# Patient Record
Sex: MALE | Race: WHITE | Employment: FULL TIME | URBAN - NONMETROPOLITAN AREA
[De-identification: names, ages, dates, MRNs, and addresses within clinical notes are randomized per-mention and may not be internally consistent; named-entity substitution may affect disease eponyms.]

---

## 2013-07-06 LAB
A/G RATIO: NORMAL
ALBUMIN SERPL-MCNC: 4.2 G/DL
ALP BLD-CCNC: 50 U/L
ALT SERPL-CCNC: 43 U/L
AST SERPL-CCNC: 32 U/L
BILIRUB SERPL-MCNC: 0.7 MG/DL (ref 0.1–1.4)
BILIRUBIN DIRECT: 0.7 MG/DL
BILIRUBIN, INDIRECT: NORMAL
BUN BLDV-MCNC: 14 MG/DL
CALCIUM SERPL-MCNC: 9.5 MG/DL
CHLORIDE BLD-SCNC: 105 MMOL/L
CHOLESTEROL, TOTAL: 204 MG/DL
CHOLESTEROL/HDL RATIO: 5.1
CO2: 27 MMOL/L
CREAT SERPL-MCNC: 1.5 MG/DL
EGFR: 49
GLOBULIN: NORMAL
GLUCOSE BLD-MCNC: 116 MG/DL
HDLC SERPL-MCNC: 40 MG/DL (ref 35–70)
LDL CHOLESTEROL CALCULATED: NORMAL
NONHDLC SERPL-MCNC: NORMAL MG/DL
POTASSIUM SERPL-SCNC: 4.5 MMOL/L
PROTEIN TOTAL: 6.8 G/DL
SODIUM BLD-SCNC: 137 MMOL/L
TRIGL SERPL-MCNC: 261 MG/DL
VLDLC SERPL CALC-MCNC: 52 MG/DL

## 2016-02-06 LAB
A/G RATIO: NORMAL
ALBUMIN SERPL-MCNC: 4.6 G/DL
ALP BLD-CCNC: 69 U/L
ALT SERPL-CCNC: 69 U/L
AST SERPL-CCNC: 59 U/L
BASOPHILS ABSOLUTE: 100 /ΜL
BASOPHILS RELATIVE PERCENT: 1.5 %
BILIRUB SERPL-MCNC: 1.2 MG/DL (ref 0.1–1.4)
BILIRUBIN DIRECT: 0.2 MG/DL
BILIRUBIN, INDIRECT: 1.2
BUN BLDV-MCNC: 18 MG/DL
CALCIUM SERPL-MCNC: 9.9 MG/DL
CHLORIDE BLD-SCNC: 101 MMOL/L
CHOLESTEROL, TOTAL: 230 MG/DL
CHOLESTEROL/HDL RATIO: ABNORMAL
CO2: 27 MMOL/L
CREAT SERPL-MCNC: 1.39 MG/DL
EGFR: 53
EOSINOPHILS ABSOLUTE: 500 /ΜL
EOSINOPHILS RELATIVE PERCENT: 6.7 %
GLOBULIN: NORMAL
GLUCOSE BLD-MCNC: 237 MG/DL
HCT VFR BLD CALC: 46.9 % (ref 41–53)
HDLC SERPL-MCNC: 31 MG/DL (ref 35–70)
HEMOGLOBIN: 15.7 G/DL (ref 13.5–17.5)
LDL CHOLESTEROL CALCULATED: 168 MG/DL (ref 0–160)
LYMPHOCYTES ABSOLUTE: 2800 /ΜL
LYMPHOCYTES RELATIVE PERCENT: 40.6 %
MCH RBC QN AUTO: 32 PG
MCHC RBC AUTO-ENTMCNC: 33.5 G/DL
MCV RBC AUTO: 95.6 FL
MONOCYTES ABSOLUTE: 900 /ΜL
MONOCYTES RELATIVE PERCENT: 12.7 %
NEUTROPHILS ABSOLUTE: 2700 /ΜL
NEUTROPHILS RELATIVE PERCENT: 38.5 %
NONHDLC SERPL-MCNC: ABNORMAL MG/DL
PLATELET # BLD: 172 K/ΜL
PMV BLD AUTO: NORMAL FL
POTASSIUM SERPL-SCNC: 4.7 MMOL/L
PROTEIN TOTAL: 7.4 G/DL
RBC # BLD: 4.9 10^6/ΜL
SODIUM BLD-SCNC: 138 MMOL/L
TRIGL SERPL-MCNC: 357 MG/DL
VLDLC SERPL CALC-MCNC: 71 MG/DL
WBC # BLD: 6.9 10^3/ML

## 2017-07-21 ENCOUNTER — HOSPITAL ENCOUNTER (EMERGENCY)
Age: 57
Discharge: HOME | End: 2017-07-21
Payer: COMMERCIAL

## 2017-07-21 VITALS
OXYGEN SATURATION: 98 % | HEART RATE: 74 BPM | DIASTOLIC BLOOD PRESSURE: 95 MMHG | WEIGHT: 240 LBS | SYSTOLIC BLOOD PRESSURE: 145 MMHG | HEIGHT: 70 IN | TEMPERATURE: 97.8 F | RESPIRATION RATE: 16 BRPM | BODY MASS INDEX: 34.36 KG/M2

## 2017-07-21 DIAGNOSIS — S39.012A LUMBAR STRAIN, INITIAL ENCOUNTER: Primary | ICD-10-CM

## 2017-07-21 PROCEDURE — 96372 THER/PROPH/DIAG INJ SC/IM: CPT

## 2017-07-21 PROCEDURE — 99213 OFFICE O/P EST LOW 20 MIN: CPT | Performed by: NURSE PRACTITIONER

## 2017-07-21 PROCEDURE — 6360000002 HC RX W HCPCS: Performed by: NURSE PRACTITIONER

## 2017-07-21 PROCEDURE — 99203 OFFICE O/P NEW LOW 30 MIN: CPT

## 2017-07-21 RX ORDER — NAPROXEN 500 MG/1
500 TABLET ORAL 2 TIMES DAILY WITH MEALS
Qty: 20 TABLET | Refills: 0 | Status: SHIPPED | OUTPATIENT
Start: 2017-07-21 | End: 2017-07-31

## 2017-07-21 RX ORDER — CYCLOBENZAPRINE HCL 10 MG
10 TABLET ORAL 3 TIMES DAILY PRN
Qty: 15 TABLET | Refills: 0 | Status: SHIPPED | OUTPATIENT
Start: 2017-07-21 | End: 2017-07-31

## 2017-07-21 RX ORDER — ORPHENADRINE CITRATE 30 MG/ML
60 INJECTION INTRAMUSCULAR; INTRAVENOUS ONCE
Status: COMPLETED | OUTPATIENT
Start: 2017-07-21 | End: 2017-07-21

## 2017-07-21 RX ADMIN — ORPHENADRINE CITRATE 60 MG: 30 INJECTION INTRAMUSCULAR; INTRAVENOUS at 19:08

## 2017-07-21 ASSESSMENT — ENCOUNTER SYMPTOMS
DIARRHEA: 0
EYE DISCHARGE: 0
TROUBLE SWALLOWING: 0
ABDOMINAL PAIN: 0
EYE REDNESS: 0
NAUSEA: 0
EYE ITCHING: 0
CONSTIPATION: 0
VOMITING: 0
SORE THROAT: 0
CHEST TIGHTNESS: 0
SINUS PRESSURE: 0
SHORTNESS OF BREATH: 0
WHEEZING: 0
EYE PAIN: 0
BACK PAIN: 1
COUGH: 0
RHINORRHEA: 0

## 2017-07-21 ASSESSMENT — PAIN SCALES - GENERAL
PAINLEVEL_OUTOF10: 5
PAINLEVEL_OUTOF10: 6

## 2017-07-21 ASSESSMENT — PAIN DESCRIPTION - DESCRIPTORS: DESCRIPTORS: ACHING

## 2017-07-21 ASSESSMENT — PAIN DESCRIPTION - PAIN TYPE
TYPE: ACUTE PAIN
TYPE: ACUTE PAIN

## 2017-07-21 ASSESSMENT — PAIN DESCRIPTION - ORIENTATION: ORIENTATION: LOWER

## 2017-07-21 ASSESSMENT — PAIN DESCRIPTION - LOCATION: LOCATION: BACK

## 2021-09-16 LAB
BASOPHILS ABSOLUTE: 0 /ΜL
BASOPHILS RELATIVE PERCENT: 0.2 %
BUN BLDV-MCNC: 52 MG/DL
CALCIUM SERPL-MCNC: 8.9 MG/DL
CHLORIDE BLD-SCNC: 105 MMOL/L
CO2: 22 MMOL/L
CREAT SERPL-MCNC: 2.05 MG/DL
EGFR: 33
EOSINOPHILS ABSOLUTE: 200 /ΜL
EOSINOPHILS RELATIVE PERCENT: 2.9 %
GLUCOSE BLD-MCNC: 102 MG/DL
HCT VFR BLD CALC: 36.2 % (ref 41–53)
HEMOGLOBIN: 12.7 G/DL (ref 13.5–17.5)
LYMPHOCYTES ABSOLUTE: 1600 /ΜL
LYMPHOCYTES RELATIVE PERCENT: 20.5 %
MCH RBC QN AUTO: 33.8 PG
MCHC RBC AUTO-ENTMCNC: 35.1 G/DL
MCV RBC AUTO: 96.4 FL
MONOCYTES ABSOLUTE: 1100 /ΜL
MONOCYTES RELATIVE PERCENT: 14.1 %
NEUTROPHILS ABSOLUTE: 4900 /ΜL
NEUTROPHILS RELATIVE PERCENT: 62.3 %
PLATELET # BLD: 262 K/ΜL
PMV BLD AUTO: ABNORMAL FL
POTASSIUM SERPL-SCNC: 4.3 MMOL/L
RBC # BLD: 3.76 10^6/ΜL
SODIUM BLD-SCNC: 134 MMOL/L
WBC # BLD: 7.8 10^3/ML

## 2023-07-24 PROBLEM — G20.C PRIMARY PARKINSONISM: Status: ACTIVE | Noted: 2023-07-24

## 2023-07-24 PROBLEM — G47.52 REM SLEEP BEHAVIOR DISORDER: Status: ACTIVE | Noted: 2023-07-24

## 2023-07-24 PROBLEM — G20 PRIMARY PARKINSONISM (HCC): Status: ACTIVE | Noted: 2023-07-24

## 2023-07-24 PROBLEM — N18.4 STAGE 4 CHRONIC KIDNEY DISEASE (HCC): Status: ACTIVE | Noted: 2023-07-24

## 2023-08-07 ENCOUNTER — OFFICE VISIT (OUTPATIENT)
Dept: NEPHROLOGY | Age: 63
End: 2023-08-07
Payer: COMMERCIAL

## 2023-08-07 VITALS
OXYGEN SATURATION: 96 % | BODY MASS INDEX: 35.65 KG/M2 | SYSTOLIC BLOOD PRESSURE: 120 MMHG | DIASTOLIC BLOOD PRESSURE: 73 MMHG | HEIGHT: 70 IN | HEART RATE: 71 BPM | WEIGHT: 249 LBS

## 2023-08-07 DIAGNOSIS — E11.21 DIABETIC NEPHROPATHY ASSOCIATED WITH TYPE 2 DIABETES MELLITUS (HCC): ICD-10-CM

## 2023-08-07 DIAGNOSIS — M15.8 OTHER OSTEOARTHRITIS INVOLVING MULTIPLE JOINTS: ICD-10-CM

## 2023-08-07 DIAGNOSIS — I10 PRIMARY HYPERTENSION: ICD-10-CM

## 2023-08-07 DIAGNOSIS — N18.4 CKD (CHRONIC KIDNEY DISEASE), STAGE IV (HCC): Primary | ICD-10-CM

## 2023-08-07 DIAGNOSIS — E78.5 DYSLIPIDEMIA: ICD-10-CM

## 2023-08-07 PROCEDURE — 3074F SYST BP LT 130 MM HG: CPT | Performed by: INTERNAL MEDICINE

## 2023-08-07 PROCEDURE — 3078F DIAST BP <80 MM HG: CPT | Performed by: INTERNAL MEDICINE

## 2023-08-07 PROCEDURE — 99204 OFFICE O/P NEW MOD 45 MIN: CPT | Performed by: INTERNAL MEDICINE

## 2023-08-07 RX ORDER — LABETALOL 100 MG/1
100 TABLET, FILM COATED ORAL 2 TIMES DAILY
COMMUNITY

## 2023-08-07 RX ORDER — ESZOPICLONE 1 MG/1
1 TABLET, FILM COATED ORAL NIGHTLY
COMMUNITY

## 2023-08-07 RX ORDER — ALLOPURINOL 100 MG/1
100 TABLET ORAL DAILY
Qty: 90 TABLET | Refills: 1 | Status: SHIPPED | OUTPATIENT
Start: 2023-08-07

## 2023-08-07 RX ORDER — ALPRAZOLAM 0.25 MG/1
0.25 TABLET ORAL NIGHTLY PRN
COMMUNITY

## 2023-08-07 RX ORDER — PRASUGREL 10 MG/1
10 TABLET, FILM COATED ORAL DAILY
COMMUNITY

## 2023-08-07 RX ORDER — RANOLAZINE 500 MG/1
500 TABLET, EXTENDED RELEASE ORAL 2 TIMES DAILY
COMMUNITY

## 2023-08-07 RX ORDER — PHENOL 1.4 %
AEROSOL, SPRAY (ML) MUCOUS MEMBRANE
COMMUNITY

## 2023-08-07 RX ORDER — ISOSORBIDE MONONITRATE 30 MG/1
30 TABLET, EXTENDED RELEASE ORAL DAILY
COMMUNITY

## 2023-08-07 RX ORDER — FENOFIBRATE 145 MG/1
145 TABLET, COATED ORAL DAILY
COMMUNITY

## 2023-08-07 RX ORDER — HYDRALAZINE HYDROCHLORIDE 25 MG/1
25 TABLET, FILM COATED ORAL 3 TIMES DAILY
COMMUNITY

## 2023-08-07 RX ORDER — ATORVASTATIN CALCIUM 40 MG/1
40 TABLET, FILM COATED ORAL DAILY
COMMUNITY

## 2023-08-07 NOTE — PROGRESS NOTES
Nephrology Consult Note  Patient's Name: Chris Zamudio  9:02 AM  8/7/2023    Nephrologist: Teresa Hassan    Reason for Consult: Elevated serum creatinine level  Requesting Physician:  No att. providers found  PCP: Gorge Ann MD    Chief Complaint: None  Assessment    ICD-10-CM    1. CKD (chronic kidney disease), stage IV ([address])  N18.4       2. Diabetic nephropathy associated with type 2 diabetes mellitus (HCC)  E11.21       3. Primary hypertension  I10       4. Dyslipidemia  E78.5       5. Other osteoarthritis involving multiple joints  M15.8             Plan    I discussed my thoughts at length with the patient. He understood well. His questions were addressed to his satisfaction. He has chronic kidney disease due to combination of diabetic nephropathy and hypertensive nephrosclerosis and is probably exacerbated by medication naprosyn  This was discussed with the patient   Discontinue naprosyn completely   Avoid nonsteroidal anti-inflammatory drugs. Comprehensive list provided to the patient. Kidney ultrasound baseline and also to rule out obstructive uropathy which I doubt  Check markers of chronicity  Serologies for the sake of completeness. Return visit in 4 weeks with labs      History Obtained From: Patient and electronic medical record  History of Present Ilness:    Chris Zamudio is a 66 y.o. male with history of diabetes mellitus, hypertension, chronic gout, degenerative joint disease among other things referred to our office due to elevated serum creatinine level. Review of his record reveals a serum creatinine of 3.0 mg/dL on lab examination done April 2023. No previous labs for comparison. In any case he was asked to see us for evaluation and management. He also has  degenerative joint disease and has been taking naproxen routinely as a modifying factor with exacerbation by movement. No chest pain or shortness of breath. Appetite is good. Seen by different nephrologist in the past from another

## 2023-08-07 NOTE — PATIENT INSTRUCTIONS
Drugs to Avoid with Chronic Kidney Disease    Non-steroidal anti-inflammatory drugs (NSAIDS)    Potential complication and side effects of NSAIDS include:  Kidney injury and worsening kidney function   Risk of stomach ulcer and intestinal bleeding  Fluid retention and edema  Increased Blood Pressure    Non-Steroidal Anti-Inflammatory Drugs    Celecoxib (Celebrex)  Choline and magnesium salicylates (CMT, Trisosal, Trilisate)  Choline salicylate (Arthropan)  Diclofenac (Voltaren, Arthrotec, Cataflam, Cambia, Voltaren-XR, Zipsor)  Diflunisal (Dolobid)  Etodolac (Lodine XL, Lodine)  Famotidine + Ibuprofen (Duexis)  Fenoprofen (Nalfon, Nalfon 200)  Furbiprofen (Asaid)  Ibuprofen (Advil, Motrin, Midol, Nuprin, Genpril, Dolgesic,Profen,, Vicoprofen,Combunox, Actiprofen, Addaprin, Caldolor, Haltran, Q-Profen,Ibren, Menadol, Rufen,Saleto-200, [place],Ultraprin, Uni-Pro,Wal-Profen)  Indomethacin (Indocin, Indomethegan, Indo-Nitin)   Ketoprofen (Orudis  KT, Oruvail, Actron)  Ketorolac (Toradol, Sprix)  Magnesium Sulfate (Arthritab, Angelina Select, Kendall's pills, Luis, Mobidin, Mobogesic)  Meclofenamate Sodium (Ponstel)  Meloxicam (Mobic)  Naproxen (Aleve, Anaprox, Naprosyn, EC-Naprosyn, Naprelan, All Day Pain Relief, Aflaxen, Anaprox-DS, Midol Extended Relief, Naprelan,Prevacid NapraPac, Naprapac, Vimovo)  Nabumetone (Relafen)  Oxaprozin (Daypro)  Piroxicam (Feldene)  Rofecoxib (Vioxx)  Salsalate (Amigesic, Anaflex 750, Disalcid, Marthritic, Mono-gesic, Salflex, Salsitab)  Sodium salicylate (various generics)  Sulindac (Clinoril)  Tolmetin (Tolectin)  Valdecoxib (Bextra)  Mefenamic Acid (Ponstel)  Famotidine and Ibuprofen (Duexis) but not famotidine by itself  Meclofenamate (Meclomen)    Antibiotics  Bactrim  Gentamycin  Tobramycin  Vancomycin  Tetracycline  Macrobid    Other                  IV contrast dye

## 2023-08-11 ENCOUNTER — HOSPITAL ENCOUNTER (OUTPATIENT)
Dept: ULTRASOUND IMAGING | Age: 63
Discharge: HOME | End: 2023-08-11
Attending: INTERNAL MEDICINE
Payer: COMMERCIAL

## 2023-08-11 DIAGNOSIS — N18.4 CKD (CHRONIC KIDNEY DISEASE), STAGE IV (HCC): ICD-10-CM

## 2023-08-11 PROCEDURE — 76770 US EXAM ABDO BACK WALL COMP: CPT

## 2023-09-02 LAB
ANION GAP SERPL CALCULATED.3IONS-SCNC: 5 MMOL/L (ref 4–12)
BUN BLDV-MCNC: 33 MG/DL (ref 7–20)
CALCIUM SERPL-MCNC: 9.3 MG/DL (ref 8.8–10.5)
CHLORIDE BLD-SCNC: 109 MEQ/L (ref 101–111)
CO2: 26 MEQ/L (ref 21–32)
CREAT SERPL-MCNC: 2.75 MG/DL (ref 0.6–1.3)
CREATININE CLEARANCE: 24
CREATININE, RANDOM URINE: 134.8 MG/DL
GLUCOSE: 96 MG/DL (ref 70–110)
MICROALBUMIN UR-MCNC: 694 MG/L
MICROALBUMIN/CREAT UR-RTO: 514.3 MG/GM (ref 0–30)
PARATHYROID HORMONE INTACT: 90 U/ML (ref 12–88)
POTASSIUM SERPL-SCNC: 4.3 MEQ/L (ref 3.6–5)
PROTEIN, URINE, RANDOM: 103.3 MG/DL
PROTEIN/CREAT RATIO: 0.77 G/1.73M2
SODIUM BLD-SCNC: 140 MEQ/L (ref 135–145)
VITAMIN D 25-HYDROXY: 24.6 NG/ML (ref 30–100)

## 2023-09-05 LAB
COMPLEMENT C3: 136 MG/DL (ref 75–175)
COMPLEMENT C4: 23 MG/DL (ref 14–40)
FREE LIGHT CHAINS,SERUM: ABNORMAL

## 2023-09-11 ENCOUNTER — OFFICE VISIT (OUTPATIENT)
Dept: NEPHROLOGY | Age: 63
End: 2023-09-11
Payer: COMMERCIAL

## 2023-09-11 VITALS
HEART RATE: 64 BPM | HEIGHT: 69 IN | DIASTOLIC BLOOD PRESSURE: 87 MMHG | OXYGEN SATURATION: 97 % | BODY MASS INDEX: 37.06 KG/M2 | WEIGHT: 250.2 LBS | SYSTOLIC BLOOD PRESSURE: 152 MMHG

## 2023-09-11 DIAGNOSIS — E55.9 VITAMIN D DEFICIENCY: ICD-10-CM

## 2023-09-11 DIAGNOSIS — I10 PRIMARY HYPERTENSION: ICD-10-CM

## 2023-09-11 DIAGNOSIS — N25.81 HYPERPARATHYROIDISM, SECONDARY RENAL (HCC): ICD-10-CM

## 2023-09-11 DIAGNOSIS — E11.21 DIABETIC NEPHROPATHY ASSOCIATED WITH TYPE 2 DIABETES MELLITUS (HCC): ICD-10-CM

## 2023-09-11 DIAGNOSIS — N18.4 CKD (CHRONIC KIDNEY DISEASE), STAGE IV (HCC): Primary | ICD-10-CM

## 2023-09-11 PROCEDURE — 99214 OFFICE O/P EST MOD 30 MIN: CPT | Performed by: INTERNAL MEDICINE

## 2023-09-11 PROCEDURE — 3077F SYST BP >= 140 MM HG: CPT | Performed by: INTERNAL MEDICINE

## 2023-09-11 PROCEDURE — 3079F DIAST BP 80-89 MM HG: CPT | Performed by: INTERNAL MEDICINE

## 2023-09-11 RX ORDER — FLASH GLUCOSE SENSOR
KIT MISCELLANEOUS
COMMUNITY

## 2023-09-11 NOTE — PROGRESS NOTES
or bruit **  Cardiovascular: Regular sinus rhythm without murmur, rubs or gallops   Respiratory:  Clear to auscultation with no wheezes or rales  Abdomen: Good bowel sound, soft, non tender and no bruit  Ext: No LE edema  Musculoskeletal:Intact  Neuro:Alert, awake and oriented with no obvious focal deficit. Speech is normal.**    Electronically signed by Erin Tierney MD on 9/11/2023 at 8:37 AM   **This report has been created using voice recognition software. It maycontain minor  errors which are inherent in voice recognition technology. **

## 2024-01-27 LAB
ANION GAP SERPL CALCULATED.3IONS-SCNC: 5 MMOL/L (ref 4–12)
BUN BLDV-MCNC: 27 MG/DL (ref 7–20)
CALCIUM SERPL-MCNC: 9.1 MG/DL (ref 8.8–10.5)
CHLORIDE BLD-SCNC: 106 MEQ/L (ref 101–111)
CO2: 29 MEQ/L (ref 21–32)
CREAT SERPL-MCNC: 2.77 MG/DL (ref 0.6–1.3)
CREATININE CLEARANCE: 23
CREATININE, RANDOM URINE: 98.7 MG/DL
GLUCOSE: 93 MG/DL (ref 70–110)
PARATHYROID HORMONE INTACT: 74.7 U/ML (ref 12–88)
POTASSIUM SERPL-SCNC: 4.2 MEQ/L (ref 3.6–5)
PROTEIN, URINE, RANDOM: 70.3 MG/DL
PROTEIN/CREAT RATIO: 0.71 G/1.73M2
SODIUM BLD-SCNC: 140 MEQ/L (ref 135–145)
VITAMIN D 25-HYDROXY: 41.6 NG/ML (ref 30–100)

## 2024-02-05 ENCOUNTER — OFFICE VISIT (OUTPATIENT)
Dept: NEPHROLOGY | Age: 64
End: 2024-02-05
Payer: COMMERCIAL

## 2024-02-05 VITALS
BODY MASS INDEX: 35.25 KG/M2 | HEIGHT: 69 IN | DIASTOLIC BLOOD PRESSURE: 82 MMHG | OXYGEN SATURATION: 100 % | WEIGHT: 238 LBS | HEART RATE: 61 BPM | SYSTOLIC BLOOD PRESSURE: 139 MMHG

## 2024-02-05 DIAGNOSIS — N18.4 CKD (CHRONIC KIDNEY DISEASE), STAGE IV (HCC): Primary | ICD-10-CM

## 2024-02-05 DIAGNOSIS — E11.21 DIABETIC NEPHROPATHY ASSOCIATED WITH TYPE 2 DIABETES MELLITUS (HCC): ICD-10-CM

## 2024-02-05 DIAGNOSIS — R80.9 PROTEINURIA, UNSPECIFIED TYPE: ICD-10-CM

## 2024-02-05 DIAGNOSIS — N25.81 HYPERPARATHYROIDISM, SECONDARY RENAL (HCC): ICD-10-CM

## 2024-02-05 DIAGNOSIS — I10 PRIMARY HYPERTENSION: ICD-10-CM

## 2024-02-05 PROCEDURE — 3075F SYST BP GE 130 - 139MM HG: CPT | Performed by: INTERNAL MEDICINE

## 2024-02-05 PROCEDURE — 3079F DIAST BP 80-89 MM HG: CPT | Performed by: INTERNAL MEDICINE

## 2024-02-05 PROCEDURE — 99213 OFFICE O/P EST LOW 20 MIN: CPT | Performed by: INTERNAL MEDICINE

## 2024-02-05 NOTE — PROGRESS NOTES
Renal Progress Note    Assessment and Plan:      Diagnosis Orders   1. CKD (chronic kidney disease), stage IV ([facility])  Basic Metabolic Panel      2. Diabetic nephropathy associated with type 2 diabetes mellitus ([facility])  Protein / creatinine ratio, urine      3. Proteinuria, unspecified type        4. Primary hypertension        5. Hyperparathyroidism, secondary renal (HCC)  PTH, Intact    Vitamin D 25 Hydroxy                PLAN:  Lab result reviewed with the patient and spouse together in epic  They understood  Their questions were addressed  Serum creatinine is mostly stable at 2.77 mg/dL  Urine protein creatinine ratio 0.71 g  PTH is good at 74.7  Vitamin D level is good at 41.6  Medications reviewed  Will not change anything  Continue oral vitamin D supplement  Return visit in 6 months with labs          Patient Active Problem List   Diagnosis    Primary parkinsonism    REM sleep behavior disorder    Stage 4 chronic kidney disease (HCC)           Subjective:   Chief complaint:  Chief Complaint   Patient presents with    Follow-up     Ckd iv      HPI:This is a follow up visit for Mr Jaspreet Medina here today for return appointment.  I see him for chronic kidney disease among other things.  He was last seen in September 2023..  Doing well.  No complaint today.  No recent chest pain .No hospitalizations since last time I saw him.  He had been started on carbidopa levodopa since last time I saw him for Parkinson disease by the neurologist.    ROS:  Pertinent positives stated above in HPI. All other systems were reviewed and were negative.  Medications:     Current Outpatient Medications   Medication Sig Dispense Refill    carbidopa-levodopa (SINEMET)  MG per tablet Take 1 tablet by mouth 3 times daily      Semaglutide (OZEMPIC, 0.25 OR 0.5 MG/DOSE, SC) Inject into the skin      Continuous Blood Gluc  (FREESTYLE ODALYS READER) ELGIN by Does not apply route      Continuous Blood Gluc Sensor (FREESTYLE ODALYS 14

## 2024-05-20 ENCOUNTER — TELEPHONE (OUTPATIENT)
Dept: INTERNAL MEDICINE | Age: 64
End: 2024-05-20

## 2024-05-20 NOTE — TELEPHONE ENCOUNTER
Specialty Medication Service    Date: 5/20/2024  Patient's Name: Jaspreet Keating YOB: 1960            _____________________________________________________________________________________________    Patient is enrolled in the [facility] pharmacy benefits. A prescription for Repatha was sent to [facility] pharmacy, however per patient insurance it will need to go to [facility]. Please review, sign and fax to pharmacy if order is still appropriate.     Aj Perry, PharmD [facility]  Ambulatory Clinical Pharmacist  Specialty Medication Services  Phone: [phone]  Fax: [phone]

## 2024-05-21 ENCOUNTER — TELEPHONE (OUTPATIENT)
Dept: INTERNAL MEDICINE | Age: 64
End: 2024-05-21

## 2024-05-21 ENCOUNTER — ENROLLMENT (OUTPATIENT)
Dept: INTERNAL MEDICINE | Age: 64
End: 2024-05-21

## 2024-05-21 NOTE — TELEPHONE ENCOUNTER
Specialty Medication Service    Date: 5/21/2024  Patient's Name: Jaspreet Keating YOB: 1960            _____________________________________________________________________________________________    Spoke with patient to reschedule PharmD initial appointment for Specialty Medication Services. Patient scheduled 05/22/24 at 9 am.      Paulina Lujan CPhT  Pharmacy   Specialty Medication Services   Phone: [phone] option 4    For Pharmacy Admin Tracking Only    Program: [facility]  CPA in place:  No  Recommendation Provided To: Patient/Caregiver: 1 via Telephone  Intervention Detail: Scheduled Appointment  Intervention Accepted By: Patient/Caregiver: 1  Gap Closed?:    Time Spent (min): 15

## 2024-05-21 NOTE — TELEPHONE ENCOUNTER
Specialty Medication Service    Date: 5/21/2024  Patient's Name: Jaspreet Keating YOB: 1960            _____________________________________________________________________________________________    Called patient's specialist office to request most recent office visit summary and relevant labs for Specialty Medication Service formulary medication. Left message and sent fax to have faxed to [phone].     Notes received and added to media.    Paulina Lujan CPhT  Pharmacy   Specialty Medication Services   Phone: [phone] option 4    For Pharmacy Admin Tracking Only    Program: [facility]  CPA in place:  No  Recommendation Provided To: Provider: 1 via Called provider office and Fax sent to office  Intervention Detail:   Intervention Accepted By: Provider: 1  Gap Closed?:    Time Spent (min): 15

## 2024-05-22 ENCOUNTER — PHARMACY VISIT (OUTPATIENT)
Dept: INTERNAL MEDICINE | Age: 64
End: 2024-05-22

## 2024-05-22 ENCOUNTER — TELEPHONE (OUTPATIENT)
Dept: INTERNAL MEDICINE | Age: 64
End: 2024-05-22

## 2024-05-22 DIAGNOSIS — I25.10 ARTERIOSCLEROSIS OF CORONARY ARTERY: Primary | ICD-10-CM

## 2024-05-22 PROBLEM — I21.9 MYOCARDIAL INFARCTION (HCC): Status: ACTIVE | Noted: 2024-05-22

## 2024-05-22 PROBLEM — R06.81 APNEA: Status: ACTIVE | Noted: 2024-05-22

## 2024-05-22 PROBLEM — M10.9 GOUT: Status: ACTIVE | Noted: 2024-05-22

## 2024-05-22 PROBLEM — E11.9 DIABETES MELLITUS (HCC): Status: ACTIVE | Noted: 2024-05-22

## 2024-05-22 RX ORDER — EVOLOCUMAB 140 MG/ML
140 INJECTION, SOLUTION SUBCUTANEOUS
COMMUNITY
Start: 2023-05-08

## 2024-05-22 ASSESSMENT — PROMIS GLOBAL HEALTH SCALE
SUM OF RESPONSES TO QUESTIONS 2, 4, 5, & 10: 18
IN GENERAL, HOW WOULD YOU RATE YOUR PHYSICAL HEALTH [ON A SCALE OF 1 (POOR) TO 5 (EXCELLENT)]?: VERY GOOD
IN GENERAL, WOULD YOU SAY YOUR QUALITY OF LIFE IS...[ON A SCALE OF 1 (POOR) TO 5 (EXCELLENT)]: EXCELLENT
TO WHAT EXTENT ARE YOU ABLE TO CARRY OUT YOUR EVERYDAY PHYSICAL ACTIVITIES SUCH AS WALKING, CLIMBING STAIRS, CARRYING GROCERIES, OR MOVING A CHAIR [ON A SCALE OF 1 (NOT AT ALL) TO 5 (COMPLETELY)]?: COMPLETELY
IN GENERAL, WOULD YOU SAY YOUR HEALTH IS...[ON A SCALE OF 1 (POOR) TO 5 (EXCELLENT)]: EXCELLENT
IN THE PAST 7 DAYS, HOW OFTEN HAVE YOU BEEN BOTHERED BY EMOTIONAL PROBLEMS, SUCH AS FEELING ANXIOUS, DEPRESSED, OR IRRITABLE [ON A SCALE FROM 1 (NEVER) TO 5 (ALWAYS)]?: NEVER
IN GENERAL, PLEASE RATE HOW WELL YOU CARRY OUT YOUR USUAL SOCIAL ACTIVITIES (INCLUDES ACTIVITIES AT HOME, AT WORK, AND IN YOUR COMMUNITY, AND RESPONSIBILITIES AS A PARENT, CHILD, SPOUSE, EMPLOYEE, FRIEND, ETC) [ON A SCALE OF 1 (POOR) TO 5 (EXCELLENT)]?: VERY GOOD
IN GENERAL, HOW WOULD YOU RATE YOUR SATISFACTION WITH YOUR SOCIAL ACTIVITIES AND RELATIONSHIPS [ON A SCALE OF 1 (POOR) TO 5 (EXCELLENT)]?: VERY GOOD
IN THE PAST 7 DAYS, HOW WOULD YOU RATE YOUR PAIN ON AVERAGE [ON A SCALE FROM 0 (NO PAIN) TO 10 (WORST IMAGINABLE PAIN)]?: 1
SUM OF RESPONSES TO QUESTIONS 3, 6, 7, & 8: 15
IN GENERAL, HOW WOULD YOU RATE YOUR MENTAL HEALTH, INCLUDING YOUR MOOD AND YOUR ABILITY TO THINK [ON A SCALE OF 1 (POOR) TO 5 (EXCELLENT)]?: VERY GOOD

## 2024-05-22 ASSESSMENT — PATIENT HEALTH QUESTIONNAIRE - PHQ9
SUM OF ALL RESPONSES TO PHQ QUESTIONS 1-9: 0
SUM OF ALL RESPONSES TO PHQ9 QUESTIONS 1 & 2: 0
1. LITTLE INTEREST OR PLEASURE IN DOING THINGS: NOT AT ALL
SUM OF ALL RESPONSES TO PHQ QUESTIONS 1-9: 0
SUM OF ALL RESPONSES TO PHQ QUESTIONS 1-9: 0
2. FEELING DOWN, DEPRESSED OR HOPELESS: NOT AT ALL
SUM OF ALL RESPONSES TO PHQ QUESTIONS 1-9: 0

## 2024-05-22 NOTE — TELEPHONE ENCOUNTER
Specialty Medication Service    Date: 5/22/2024  Patient's Name: Jaspreet Keating YOB: 1960            _____________________________________________________________________________________________    Patient's specialist office sent prescription for Repatha to [facility].    Aj Perry, PharmD [facility]  Ambulatory Clinical Pharmacist  Specialty Medication Services  Phone: [phone]  Fax: [phone]    For Pharmacy Admin Tracking Only    Program: [facility]  CPA in place:  No  Recommendation Provided To: Provider: 1 via Note to Provider  Intervention Detail: New Rx: 1, reason: Cost/Formulary Change  Intervention Accepted By: Provider: 1  Time Spent (min): 15

## 2024-05-22 NOTE — TELEPHONE ENCOUNTER
Specialty Medication Service    Date: 5/22/2024  Patient's Name: Jaspreet Keating YOB: 1960            _____________________________________________________________________________________________    Faxed patient's specialist SMS referral form for completion for compliance purposes.    Completed referral received and added to media.    Paulina Lujan CPhT  Pharmacy   Specialty Medication Services   Phone: [phone] option 4    For Pharmacy Admin Tracking Only    Program: [place]  CPA in place:  Yes  Recommendation Provided To: Provider: 1 via Fax sent to office  Intervention Detail: Referral to Other Provider  Intervention Accepted By: Provider: 1  Gap Closed?:    Time Spent (min): 15

## 2024-05-22 NOTE — PROGRESS NOTES
limitations include: none  Reviewed copay and advised patient that they will receive a copy of the patient rights and responsibility document with their welcome packet in the mail.  Patient is not considered high risk. Based on patient feedback/results of the assessment, the therapy is  still appropriate.   No medication-related problem(s) or patient need(s) identified that would require a care plan. Follow up in 180 days     Immunizations  Immunization status: up to date and documented, missing doses of shingles and PNA. Patient verbalized understanding of recommended immunizations.    Drug Interactions  No clinically significant interactions    Other Identified Potential Issues  Patient has completed SMS consent form. No questions in regard to consent form. Read consent form to patient and obtained a verbal agreement.     PLAN  Goals of therapy, common side effects, medication storage, and administration reviewed with patient.  Continue Repatha 140 mg every 14 days as prescribed  Recommended lab monitoring: None at this time  Recommended vaccinations: shingles, PCV20  Keep all scheduled appointments. Return to clinic in 6 months or call with any questions or concerns.     Deo Solomon PharmD, [facility]  Ambulatory Clinical Pharmacist   [facility] Specialty Medication Service  Phone: [phone]  [facility]  Phone: [phone] option 1    For Pharmacy Admin Tracking Only    Program: SMS  CPA in place:  No  Recommendation Provided To: Provider: 1 via Note to Provider, Patient/Caregiver: 3 via Virtual Visit, and Pharmacy: 1  Intervention Detail: Benefit Assistance, Refill(s) Provided, Scheduled Appointment, and Vaccine Recommended/Administered  Intervention Accepted By: Provider: 1, Patient/Caregiver: 3, and Pharmacy: 1  Time Spent (min): 60    Jaspreet Keating was evaluated through a synchronous (real-time) audio encounter. Patient identification was verified at the start of

## 2024-08-01 ENCOUNTER — TELEPHONE (OUTPATIENT)
Facility: HOSPITAL | Age: 64
End: 2024-08-01

## 2024-08-01 NOTE — TELEPHONE ENCOUNTER
Specialty Medication Service    Date: 8/1/2024  Patient's Name: Jaspreet Keating YOB: 1960            _____________________________________________________________________________________________    [facility]  [facility] for Specialty Medication Services. Patient scheduled 09/26/24 @920a      Sabine Hutchins CPhT  Clinical   Specialty Medication Services  Phone: [phone] option #4  Fax: [phone]    For Pharmacy Admin Tracking Only    Program: [facility]  CPA in place:  No  Recommendation Provided To: Patient/Caregiver: 1 via Telephone  Intervention Detail: Scheduled Appointment  Intervention Accepted By: Patient/Caregiver: 1   Time Spent (min): 15

## 2024-08-03 LAB
ANION GAP SERPL CALCULATED.3IONS-SCNC: 7 MMOL/L (ref 4–12)
BUN BLDV-MCNC: 30 MG/DL (ref 7–20)
CALCIUM SERPL-MCNC: 9.2 MG/DL (ref 8.8–10.5)
CHLORIDE BLD-SCNC: 106 MEQ/L (ref 101–111)
CO2: 25 MEQ/L (ref 21–32)
CREAT SERPL-MCNC: 2.6 MG/DL (ref 0.6–1.3)
CREATININE CLEARANCE: 25
CREATININE, RANDOM URINE: 108 MG/DL
GLUCOSE: 155 MG/DL (ref 70–110)
POTASSIUM SERPL-SCNC: 4.1 MEQ/L (ref 3.6–5)
PROTEIN, URINE, RANDOM: 67.2 MG/DL
PROTEIN/CREAT RATIO: 0.62 G/1.73M2
PTH INTACT: 60 U/ML (ref 12–88)
SODIUM BLD-SCNC: 138 MEQ/L (ref 135–145)
VITAMIN D 25-HYDROXY: 32.5 NG/ML (ref 30–100)

## 2024-09-17 ENCOUNTER — TELEPHONE (OUTPATIENT)
Dept: INTERNAL MEDICINE | Age: 64
End: 2024-09-17

## 2024-09-26 ENCOUNTER — OFFICE VISIT (OUTPATIENT)
Dept: NEPHROLOGY | Age: 64
End: 2024-09-26
Payer: COMMERCIAL

## 2024-09-26 ENCOUNTER — TELEPHONE (OUTPATIENT)
Dept: INTERNAL MEDICINE | Age: 64
End: 2024-09-26

## 2024-09-26 VITALS
OXYGEN SATURATION: 97 % | WEIGHT: 218 LBS | BODY MASS INDEX: 32.29 KG/M2 | HEART RATE: 68 BPM | HEIGHT: 69 IN | DIASTOLIC BLOOD PRESSURE: 86 MMHG | SYSTOLIC BLOOD PRESSURE: 138 MMHG

## 2024-09-26 DIAGNOSIS — R80.9 PROTEINURIA, UNSPECIFIED TYPE: ICD-10-CM

## 2024-09-26 DIAGNOSIS — E11.21 DIABETIC NEPHROPATHY ASSOCIATED WITH TYPE 2 DIABETES MELLITUS (HCC): ICD-10-CM

## 2024-09-26 DIAGNOSIS — I10 PRIMARY HYPERTENSION: ICD-10-CM

## 2024-09-26 DIAGNOSIS — N18.4 CKD (CHRONIC KIDNEY DISEASE), STAGE IV (HCC): Primary | ICD-10-CM

## 2024-09-26 PROCEDURE — 3079F DIAST BP 80-89 MM HG: CPT | Performed by: INTERNAL MEDICINE

## 2024-09-26 PROCEDURE — 99213 OFFICE O/P EST LOW 20 MIN: CPT | Performed by: INTERNAL MEDICINE

## 2024-09-26 PROCEDURE — 3075F SYST BP GE 130 - 139MM HG: CPT | Performed by: INTERNAL MEDICINE

## 2024-09-26 RX ORDER — ROPINIROLE 0.5 MG/1
TABLET, FILM COATED ORAL
COMMUNITY
Start: 2024-08-12

## 2024-10-02 ENCOUNTER — TELEPHONE (OUTPATIENT)
Dept: INTERNAL MEDICINE | Age: 64
End: 2024-10-02

## 2024-10-02 NOTE — TELEPHONE ENCOUNTER
Specialty Medication Service    Date: 10/2/2024  Patient's Name: Jaspreet Keating YOB: 1960            _____________________________________________________________________________________________    Spoke with patient to reschedule Medical Director  appointment for Specialty Medication Services. Patient scheduled 12/03/24 at 9am.      Paulina Lujan CPhT  Pharmacy   Specialty Medication Services   Phone: [phone] option 4    For Pharmacy Admin Tracking Only    Program: [facility]  CPA in place:  Yes  Recommendation Provided To: Patient/Caregiver: 1 via Telephone  Intervention Detail: Scheduled Appointment  Intervention Accepted By: Patient/Caregiver: 1  Gap Closed?:    Time Spent (min): 15

## 2024-11-18 ENCOUNTER — TELEPHONE (OUTPATIENT)
Dept: INTERNAL MEDICINE | Age: 64
End: 2024-11-18

## 2024-11-18 NOTE — TELEPHONE ENCOUNTER
Specialty Medication Service    Date: 11/18/2024  Patient's Name: Jaspreet Keating YOB: 1960            _____________________________________________________________________________________________    Called patient's specialist office to request most recent office visit summary and relevant labs for Specialty Medication Service formulary medication. Talked to representative in specialist's office to have faxed to [phone].     Paulina Lujan CPhT  Pharmacy   Specialty Medication Services   Phone: [phone] option 4      For Pharmacy Admin Tracking Only    Program: SMS  CPA in place:  Yes  Recommendation Provided To: Provider: 1 via Called provider office  Intervention Detail:   Intervention Accepted By: Provider: 0  Gap Closed?:    Time Spent (min): 15

## 2024-11-18 NOTE — TELEPHONE ENCOUNTER
Specialty Medication Service    Date: 11/18/2024  Patient's Name: Jaspreet Keating YOB: 1960            _____________________________________________________________________________________________    Inbound fax received from external provider containing patient medical records requested by . Patient identifiers confirmed on each page to ensure accuracy and protection of privacy. Imported into the chart media, PharmD aware notes are available for viewing.    Sabine Hutchins CPhT  Clinical   Specialty Medication Services  Phone: [phone] option #4  Fax: [phone]    For Pharmacy Admin Tracking Only    Program: [facility]  CPA in place:  No  Recommendation Provided To: Provider: 1 via Fax sent to office  Intervention Detail:   Intervention Accepted By: Provider: 1  Gap Closed?:    Time Spent (min): 10

## 2024-11-19 RX ORDER — PEN NEEDLE, DIABETIC 30 GX3/16"
1 NEEDLE, DISPOSABLE MISCELLANEOUS
COMMUNITY
Start: 2024-11-02

## 2024-11-19 RX ORDER — INSULIN LISPRO 100 [IU]/ML
14 INJECTION, SOLUTION INTRAVENOUS; SUBCUTANEOUS
COMMUNITY
Start: 2024-10-28

## 2024-11-19 NOTE — PROGRESS NOTES
(older than 55 for men, 65 for women), diabetes mellitus, dyslipidemia, male gender, obesity (BMI >= 30 kg/m2), and sedentary lifestyle.     The ASCVD Risk score (Uday GONSALVES, et al., 2019) failed to calculate for the following reasons:    The patient has a prior MI or stroke diagnosis     · Considering all the ways in which this condition and others affects you, how are you doing (0 = very well, 10 = very poorly)? 2  · How would you rate your pain on average? (0 = no pain, 10 = worst pain imaginable) 2  · During the past 4 weeks, have you missed any planned activities of daily living due to condition (work/school/other plans)? No  · During the past 4 weeks, have you had to seek urgent care, ER admission, Unplanned doctor office visit, or hospital admission? No    MEDICATIONS  Current Outpatient Medications   Medication Sig Dispense Refill    HUMALOG KWIKPEN 100 UNIT/ML SOPN Inject 14 Units into the skin 3 times daily (before meals) With sliding scale      Insulin Pen Needle (PEN NEEDLES) 31G X 5 MM MISC Inject 1 each into the skin 5 times daily      rOPINIRole (REQUIP) 0.5 MG tablet Take 1 tablet by mouth nightly 1-3 hours before bedtime      Evolocumab (REPATHA SURECLICK) 140 MG/ML SOAJ Inject 140 mg into the skin every 14 days      carbidopa-levodopa (SINEMET)  MG per tablet Take 2 tablets by mouth 3 times daily      Semaglutide (OZEMPIC, 0.25 OR 0.5 MG/DOSE, SC) Inject 0.5 mg into the skin once a week      Continuous Blood Gluc  (FREESTYLE ODALYS READER) ELGIN by Does not apply route      Continuous Blood Gluc Sensor (FREESTYLE ODALYS 14 DAY SENSOR) MISC by Does not apply route      fenofibrate (TRICOR) 145 MG tablet Take 1 tablet by mouth daily      atorvastatin (LIPITOR) 40 MG tablet Take 1 tablet by mouth daily      hydrALAZINE (APRESOLINE) 25 MG tablet Take 1 tablet by mouth 3 times daily      labetalol (NORMODYNE) 100 MG tablet Take 1 tablet by mouth 2 times daily      ranolazine (RANEXA) 500 MG

## 2024-11-20 ENCOUNTER — PHARMACY VISIT (OUTPATIENT)
Dept: INTERNAL MEDICINE | Age: 64
End: 2024-11-20

## 2024-11-20 DIAGNOSIS — I25.10 ARTERIOSCLEROSIS OF CORONARY ARTERY: Primary | ICD-10-CM

## 2024-12-04 ENCOUNTER — TELEPHONE (OUTPATIENT)
Dept: INTERNAL MEDICINE | Age: 64
End: 2024-12-04

## 2024-12-04 NOTE — TELEPHONE ENCOUNTER
Specialty Medication Service    Date: 12/4/2024  Patient's Name: Jaspreet Keating YOB: 1960            _____________________________________________________________________________________________    Patient no showed scheduled Medical Director appointment for Specialty Medication Services. Please call: [phone] option 4. Unable to contact three times to reschedule appointment. Will continue to outreach as appropriate.    Deo Solomon, FransiscoD, [facility]  Ambulatory Clinical Pharmacist   [facility] Specialty Medication Service  Phone: [phone]  [facility]  Phone: [phone] option 1    For Pharmacy Admin Tracking Only    Program: SMS  CPA in place:  No  Time Spent (min): 15

## 2024-12-11 ENCOUNTER — TELEPHONE (OUTPATIENT)
Dept: INTERNAL MEDICINE | Age: 64
End: 2024-12-11

## 2024-12-11 NOTE — TELEPHONE ENCOUNTER
Specialty Medication Service    Date: 12/11/2024  Patient's Name: Jaspreet Keating YOB: 1960            _____________________________________________________________________________________________    Email received from [facility] in regard to current SMS formulary medication, Repatha.  SMS override placed. Working to reschedule pt for md initial.    Paulina Lujan CPhT  Pharmacy   Specialty Medication Services   Phone: [phone] option 4      For Pharmacy Admin Tracking Only    Program: [facility]  CPA in place:  Yes  Recommendation Provided To: Pharmacy: 1  Intervention Detail: Benefit Assistance  Intervention Accepted By: Pharmacy: 1  Gap Closed?:    Time Spent (min): 15

## 2024-12-19 ENCOUNTER — TELEPHONE (OUTPATIENT)
Dept: PHARMACY | Age: 64
End: 2024-12-19

## 2024-12-19 NOTE — TELEPHONE ENCOUNTER
Specialty Medication Service    Date: 12/19/2024  Patient's Name: Jaspreet Keating YOB: 1960            _____________________________________________________________________________________________    Left 3 messages and Sent text message to schedule Medical Director  appointment for Specialty Medication Services. Please call: [phone] option 4. Will continue to outreach as appropriate. Needs to be scheduled with Dr. TIERNEY and changed to a [place] pt to accommodate pts time request. 3/9 attempts made, moving to next month     Paulina Lujan CPhT  Pharmacy   Specialty Medication Services   Phone: [phone] option 4    For Pharmacy Admin Tracking Only    Program: [facility]  CPA in place:  Yes  Recommendation Provided To: Patient/Caregiver: 1 via Telephone  Intervention Detail: Scheduled Appointment  Intervention Accepted By: Patient/Caregiver: 0  Gap Closed?:    Time Spent (min): 15

## 2025-01-22 ENCOUNTER — TELEPHONE (OUTPATIENT)
Dept: INTERNAL MEDICINE | Age: 65
End: 2025-01-22

## 2025-01-22 NOTE — TELEPHONE ENCOUNTER
Specialty Medication Service    Date: 1/22/2025  Patient's Name: Jaspreet Keating YOB: 1960            _____________________________________________________________________________________________    Left 3 messages and Sent text message to [facility] Medical Director  appointment for Specialty Medication Services. Please call: [phone] option 4. Will continue to outreach as appropriate. 6/9 attempts made, moving to next month    Paulina Lujan CPhT  Pharmacy   Specialty Medication Services   Phone: [phone] option 4    For Pharmacy Admin Tracking Only    Program: SMS  CPA in place:  Yes  Recommendation Provided To: Patient/Caregiver: 1 via Telephone  Intervention Detail: Scheduled Appointment  Intervention Accepted By: Patient/Caregiver: 0  Gap Closed?:    Time Spent (min): 15

## 2025-02-24 ENCOUNTER — TELEPHONE (OUTPATIENT)
Dept: INTERNAL MEDICINE | Age: 65
End: 2025-02-24

## 2025-02-24 NOTE — TELEPHONE ENCOUNTER
Specialty Medication Service    Date: 2/24/2025  Patient's Name: Jaspreet Keating YOB: 1960            _____________________________________________________________________________________________    Spoke with patient to reschedule Medical Director  appointment for Specialty Medication Services. Patient scheduled 04/28/25 at 440 pm.      Paulina Lujan CPhT  Pharmacy   Specialty Medication Services   Phone: [phone] option 4    For Pharmacy Admin Tracking Only    Program: [facility]  CPA in place:  Yes  Recommendation Provided To: Patient/Caregiver: 1 via Telephone  Intervention Detail: Scheduled Appointment  Intervention Accepted By: Patient/Caregiver: 1  Gap Closed?:    Time Spent (min): 15

## 2025-03-11 ENCOUNTER — TELEPHONE (OUTPATIENT)
Dept: INTERNAL MEDICINE | Age: 65
End: 2025-03-11

## 2025-03-11 NOTE — TELEPHONE ENCOUNTER
Specialty Medication Service    Date: 3/11/2025  Patient's Name: Jaspreet Keating YOB: 1960            _____________________________________________________________________________________________    Email received from [facility] in regard to current SMS formulary medication, Repatha.  SMS override placed. Still working to schedule pt for md initial. Has been [facility]    Paulina Lujan CPhT  Pharmacy   Specialty Medication Services   Phone: [phone] option 4    For Pharmacy Admin Tracking Only    Program: SMS  CPA in place:  Yes  Recommendation Provided To: Pharmacy: 1  Intervention Detail: Benefit Assistance  Intervention Accepted By: Pharmacy: 1  Gap Closed?:    Time Spent (min): 15

## 2025-03-22 ENCOUNTER — LAB (OUTPATIENT)
Dept: LAB | Age: 65
End: 2025-03-22

## 2025-03-22 DIAGNOSIS — R80.9 PROTEINURIA, UNSPECIFIED TYPE: ICD-10-CM

## 2025-03-22 DIAGNOSIS — N18.4 CKD (CHRONIC KIDNEY DISEASE), STAGE IV (HCC): ICD-10-CM

## 2025-03-22 LAB
25(OH)D3 SERPL-MCNC: 24 NG/ML (ref 30–100)
ANION GAP SERPL CALC-SCNC: 10 MEQ/L (ref 8–16)
BUN SERPL-MCNC: 43 MG/DL (ref 8–23)
CALCIUM SERPL-MCNC: 10.1 MG/DL (ref 8.8–10.2)
CHLORIDE SERPL-SCNC: 106 MEQ/L (ref 98–111)
CO2 SERPL-SCNC: 27 MEQ/L (ref 22–29)
CREAT SERPL-MCNC: 2.9 MG/DL (ref 0.7–1.2)
GFR SERPL CREATININE-BSD FRML MDRD: 24 ML/MIN/1.73M2
GLUCOSE SERPL-MCNC: 84 MG/DL (ref 74–109)
POTASSIUM SERPL-SCNC: 4.6 MEQ/L (ref 3.5–5.2)
SODIUM SERPL-SCNC: 143 MEQ/L (ref 135–145)

## 2025-03-24 ENCOUNTER — OFFICE VISIT (OUTPATIENT)
Dept: NEPHROLOGY | Age: 65
End: 2025-03-24
Payer: COMMERCIAL

## 2025-03-24 VITALS
HEART RATE: 63 BPM | HEIGHT: 68 IN | SYSTOLIC BLOOD PRESSURE: 132 MMHG | BODY MASS INDEX: 32.4 KG/M2 | DIASTOLIC BLOOD PRESSURE: 78 MMHG | OXYGEN SATURATION: 98 % | WEIGHT: 213.8 LBS

## 2025-03-24 DIAGNOSIS — R80.9 MICROALBUMINURIA: ICD-10-CM

## 2025-03-24 DIAGNOSIS — N18.4 CKD (CHRONIC KIDNEY DISEASE), STAGE IV (HCC): Primary | ICD-10-CM

## 2025-03-24 DIAGNOSIS — R80.9 PROTEINURIA, UNSPECIFIED TYPE: ICD-10-CM

## 2025-03-24 DIAGNOSIS — D63.8 ANEMIA OF CHRONIC DISEASE: ICD-10-CM

## 2025-03-24 DIAGNOSIS — E11.21 DIABETIC NEPHROPATHY ASSOCIATED WITH TYPE 2 DIABETES MELLITUS: ICD-10-CM

## 2025-03-24 DIAGNOSIS — E55.9 VITAMIN D DEFICIENCY: ICD-10-CM

## 2025-03-24 PROCEDURE — 99213 OFFICE O/P EST LOW 20 MIN: CPT | Performed by: INTERNAL MEDICINE

## 2025-03-24 RX ORDER — SEMAGLUTIDE 0.68 MG/ML
INJECTION, SOLUTION SUBCUTANEOUS
COMMUNITY
Start: 2025-01-14

## 2025-03-24 NOTE — PROGRESS NOTES
Renal Progress Note    Assessment and Plan:      Diagnosis Orders   1. CKD (chronic kidney disease), stage IV (HCC)        2. Diabetic nephropathy associated with type 2 diabetes mellitus (HCC)        3. Anemia of chronic disease        4. Microalbuminuria        5. Vitamin D deficiency        6. Proteinuria, unspecified type                  PLAN:  Serum creatinine is increased to 2.9 mg/dL from 2.6 mg/dL in August 2024.  This is likely due to natural formation of his underlying disease.  There may also be some prerenal component.  I discussed that with him.  He is encouraged to increase his fluid intake.  I would like to start him on ACE inhibitor or angiotensin receptor blocker but reluctant to do so especially with worsening serum creatinine level.  Diabetes mellitus is managed by another provider.  Anemia is managed by another provider.  Will check microalbumin creatinine ratio next appointment  Vitamin D level is low at 24.  It was 32.5 4 August 2024.  He was encouraged to take vitamin D3 over-the-counter 5000 international unit 1 tablet a day.  We discussed  low protein diet  Low  protein diet information provided to him due to progressive proteinuria.  Again as discussed above reluctant to give any ACE inhibitor or angiotensin receptor blocker due to worsening serum creatinine level.  Medications reviewed  No changes  Return visit in 3 months with labs          Patient Active Problem List   Diagnosis    Primary parkinsonism (HCC)    REM sleep behavior disorder    Stage 4 chronic kidney disease (HCC)    Apnea    Arteriosclerosis of coronary artery    Diabetes mellitus (HCC)    Gout    Myocardial infarction (HCC)    Diabetic nephropathy associated with type 2 diabetes mellitus (HCC)           Subjective:   Chief complaint:No chief complaint on file.     HPI:This is a follow up visit for Mr. Jaspreet Keating here today for return appointment.  I see him for chronic kidney disease among other things.  He was last

## 2025-04-28 ENCOUNTER — PHARMACY VISIT (OUTPATIENT)
Dept: INTERNAL MEDICINE | Age: 65
End: 2025-04-28

## 2025-04-28 DIAGNOSIS — I25.10 ARTERIOSCLEROSIS OF CORONARY ARTERY: Primary | ICD-10-CM

## 2025-04-29 RX ORDER — EVOLOCUMAB 140 MG/ML
140 INJECTION, SOLUTION SUBCUTANEOUS
Qty: 6 ADJUSTABLE DOSE PRE-FILLED PEN SYRINGE | Refills: 1 | Status: SHIPPED | OUTPATIENT
Start: 2025-04-29

## 2025-04-29 NOTE — PROGRESS NOTES
counseling on the following healthy behaviors: medication adherence    Discussed use, benefit, and side effects of prescribed medications.  Barriers to medication compliance addressed.  All patient questions answered.  Pt voiced understanding.    Brandy Abrams MD  Co-Director [facility] pharmacy program  Faculty Internal Medicine    [facility]  [facility]    4/29/2025, 9:27 AM

## 2025-05-14 RX ORDER — PRIMIDONE 50 MG/1
50 TABLET ORAL DAILY
COMMUNITY
Start: 2025-04-03

## 2025-05-14 RX ORDER — FESOTERODINE FUMARATE 4 MG/1
4 TABLET, FILM COATED, EXTENDED RELEASE ORAL DAILY
COMMUNITY
Start: 2025-04-01

## 2025-05-14 NOTE — PROGRESS NOTES
Specialty Medication Service    Patient's Name: Jaspreet Keating YOB: 1960      Reason for visit: Jaspreet Keating is a 65 y.o. male presenting today for Specialty Medication Service visit follow up. Patient last seen by [facility] 11/20/2024. Patient continues on [facility] formulary medication, Repatha. Pharmacy completed Specialty Medication Service visit for medication monitoring and counseling. Medication list updated.    Specialty Medication: Repatha 140 mg/ml SOAJ   Frequency: Every 14 days  Indication: hyperlipidemia  Initially Diagnosed: unknown  Additional Therapy:   Fenofibrate  Atorvastatin  Previous Therapy:   N/a     Specialist:   Seth Soriano [facility] [address] [place] [address]  [phone]  Specialist Progress Note Available: No, called office and left message  Last Specialist Visit: ~8 months ago per patient     Allergies   Allergen Reactions    Bee Venom Swelling       Past Medical History:   Diagnosis Date    Diabetes mellitus (HCC)     Gout     Hypertension     Parkinson's disease (HCC)       Social History     Tobacco Use    Smoking status: Never     Passive exposure: Past    Smokeless tobacco: Never   Substance Use Topics    Alcohol use: Yes     Comment: social     No family history on file.  INTERM HISTORY  Have you been diagnosed with any additional conditions since we last talked? no  Have you developed any new allergies since we last talked? no  Have you stopped taking any medications or supplements since we last talked? no  Have you started taking any additional medications or supplements since we last talked? no    REVIEW OF CURRENT DISEASE STATE  Hyperlipidemia: Patient has diagnosis of hyperlipidemia/CAD presents for evaluation related to specialty medication use. There is a family history of hyperlipidemia. There is a family history of early ischemia heart disease. Current symptoms: non-existent. Cardiac risk factors include advanced age (older than 55 for men, 65 for

## 2025-05-15 ENCOUNTER — TELEPHONE (OUTPATIENT)
Facility: HOSPITAL | Age: 65
End: 2025-05-15

## 2025-05-15 NOTE — TELEPHONE ENCOUNTER
Specialty Medication Service    Date: 5/15/2025  Patient's Name: Jaspreet Keating YOB: 1960            _____________________________________________________________________________________________    Called for chart notes and labs     LOV on file 01/04/24 per notes from PCP pt should have been seen recently.     Seth Castelan DO  [facility] [place]  [address]  P: [phone] F: [phone]    Sabine Hutchins CPhT  Clinical    Specialty Medication Services  Phone: [phone] option #4  Fax: [phone]    For Pharmacy Admin Tracking Only    Program: SMS  CPA in place:  Yes  Recommendation Provided To: Provider: 1 via Called provider office and Fax sent to office  Intervention Detail:   Intervention Accepted By: Provider: 0  Gap Closed?:    Time Spent (min): 10

## 2025-05-19 ASSESSMENT — PROMIS GLOBAL HEALTH SCALE
IN THE PAST 7 DAYS, HOW WOULD YOU RATE YOUR FATIGUE ON AVERAGE [ON A SCALE FROM 1 (NONE) TO 5 (VERY SEVERE)]?: SEVERE
IN GENERAL, PLEASE RATE HOW WELL YOU CARRY OUT YOUR USUAL SOCIAL ACTIVITIES (INCLUDES ACTIVITIES AT HOME, AT WORK, AND IN YOUR COMMUNITY, AND RESPONSIBILITIES AS A PARENT, CHILD, SPOUSE, EMPLOYEE, FRIEND, ETC) [ON A SCALE OF 1 (POOR) TO 5 (EXCELLENT)]?: FAIR
IN GENERAL, HOW WOULD YOU RATE YOUR MENTAL HEALTH, INCLUDING YOUR MOOD AND YOUR ABILITY TO THINK [ON A SCALE OF 1 (POOR) TO 5 (EXCELLENT)]?: FAIR
IN GENERAL, WOULD YOU SAY YOUR QUALITY OF LIFE IS...[ON A SCALE OF 1 (POOR) TO 5 (EXCELLENT)]: GOOD
IN GENERAL, PLEASE RATE HOW WELL YOU CARRY OUT YOUR USUAL SOCIAL ACTIVITIES (INCLUDES ACTIVITIES AT HOME, AT WORK, AND IN YOUR COMMUNITY, AND RESPONSIBILITIES AS A PARENT, CHILD, SPOUSE, EMPLOYEE, FRIEND, ETC) [ON A SCALE OF 1 (POOR) TO 5 (EXCELLENT)]?: FAIR
IN GENERAL, HOW WOULD YOU RATE YOUR MENTAL HEALTH, INCLUDING YOUR MOOD AND YOUR ABILITY TO THINK [ON A SCALE OF 1 (POOR) TO 5 (EXCELLENT)]?: FAIR
IN THE PAST 7 DAYS, HOW OFTEN HAVE YOU BEEN BOTHERED BY EMOTIONAL PROBLEMS, SUCH AS FEELING ANXIOUS, DEPRESSED, OR IRRITABLE [ON A SCALE FROM 1 (NEVER) TO 5 (ALWAYS)]?: OFTEN
IN GENERAL, HOW WOULD YOU RATE YOUR PHYSICAL HEALTH [ON A SCALE OF 1 (POOR) TO 5 (EXCELLENT)]?: FAIR
IN THE PAST 7 DAYS, HOW WOULD YOU RATE YOUR PAIN ON AVERAGE [ON A SCALE FROM 0 (NO PAIN) TO 10 (WORST IMAGINABLE PAIN)]?: 2
IN GENERAL, WOULD YOU SAY YOUR HEALTH IS...[ON A SCALE OF 1 (POOR) TO 5 (EXCELLENT)]: FAIR
IN THE PAST 7 DAYS, HOW WOULD YOU RATE YOUR PAIN ON AVERAGE [ON A SCALE FROM 0 (NO PAIN) TO 10 (WORST IMAGINABLE PAIN)]?: 2
SUM OF RESPONSES TO QUESTIONS 2, 4, 5, & 10: 9
TO WHAT EXTENT ARE YOU ABLE TO CARRY OUT YOUR EVERYDAY PHYSICAL ACTIVITIES SUCH AS WALKING, CLIMBING STAIRS, CARRYING GROCERIES, OR MOVING A CHAIR [ON A SCALE OF 1 (NOT AT ALL) TO 5 (COMPLETELY)]?: MODERATELY
IN GENERAL, WOULD YOU SAY YOUR QUALITY OF LIFE IS...[ON A SCALE OF 1 (POOR) TO 5 (EXCELLENT)]: GOOD
IN GENERAL, HOW WOULD YOU RATE YOUR SATISFACTION WITH YOUR SOCIAL ACTIVITIES AND RELATIONSHIPS [ON A SCALE OF 1 (POOR) TO 5 (EXCELLENT)]?: FAIR
IN THE PAST 7 DAYS, HOW OFTEN HAVE YOU BEEN BOTHERED BY EMOTIONAL PROBLEMS, SUCH AS FEELING ANXIOUS, DEPRESSED, OR IRRITABLE [ON A SCALE FROM 1 (NEVER) TO 5 (ALWAYS)]?: OFTEN
IN GENERAL, HOW WOULD YOU RATE YOUR PHYSICAL HEALTH [ON A SCALE OF 1 (POOR) TO 5 (EXCELLENT)]?: FAIR
SUM OF RESPONSES TO QUESTIONS 3, 6, 7, & 8: 9
IN GENERAL, WOULD YOU SAY YOUR HEALTH IS...[ON A SCALE OF 1 (POOR) TO 5 (EXCELLENT)]: FAIR
WHO IS THE PERSON COMPLETING THE PROMIS V1.1 SURVEY?: SELF
HOW IS THE PROMIS V1.1 BEING ADMINISTERED?: ELECTRONIC
IN GENERAL, HOW WOULD YOU RATE YOUR SATISFACTION WITH YOUR SOCIAL ACTIVITIES AND RELATIONSHIPS [ON A SCALE OF 1 (POOR) TO 5 (EXCELLENT)]?: FAIR
IN THE PAST 7 DAYS, HOW WOULD YOU RATE YOUR FATIGUE ON AVERAGE [ON A SCALE FROM 1 (NONE) TO 5 (VERY SEVERE)]?: SEVERE

## 2025-05-21 ENCOUNTER — PHARMACY VISIT (OUTPATIENT)
Age: 65
End: 2025-05-21

## 2025-05-21 DIAGNOSIS — I25.10 ARTERIOSCLEROSIS OF CORONARY ARTERY: Primary | ICD-10-CM

## 2025-05-21 RX ORDER — DOXEPIN HYDROCHLORIDE 10 MG/1
10 CAPSULE ORAL NIGHTLY
COMMUNITY
Start: 2025-05-14

## 2025-05-21 RX ORDER — EVOLOCUMAB 140 MG/ML
140 INJECTION, SOLUTION SUBCUTANEOUS
Qty: 6 ADJUSTABLE DOSE PRE-FILLED PEN SYRINGE | Refills: 1 | Status: SHIPPED | OUTPATIENT
Start: 2025-05-21

## 2025-05-21 RX ORDER — CARBIDOPA/LEVODOPA 10MG-100MG
1 TABLET ORAL 3 TIMES DAILY
COMMUNITY
Start: 2025-05-14

## 2025-06-04 ENCOUNTER — OFFICE VISIT (OUTPATIENT)
Dept: NEUROLOGY | Age: 65
End: 2025-06-04
Payer: COMMERCIAL

## 2025-06-04 VITALS
WEIGHT: 209 LBS | SYSTOLIC BLOOD PRESSURE: 128 MMHG | HEART RATE: 73 BPM | BODY MASS INDEX: 30.96 KG/M2 | DIASTOLIC BLOOD PRESSURE: 64 MMHG | HEIGHT: 69 IN

## 2025-06-04 DIAGNOSIS — G20.B2 PARKINSON'S DISEASE WITH DYSKINESIA AND FLUCTUATING MANIFESTATIONS (HCC): Primary | ICD-10-CM

## 2025-06-04 DIAGNOSIS — R49.8 HYPOPHONIA: ICD-10-CM

## 2025-06-04 DIAGNOSIS — R25.8 BRADYKINESIA: ICD-10-CM

## 2025-06-04 PROCEDURE — 99205 OFFICE O/P NEW HI 60 MIN: CPT | Performed by: PSYCHIATRY & NEUROLOGY

## 2025-06-04 RX ORDER — ASPIRIN 81 MG/1
81 TABLET ORAL DAILY
COMMUNITY

## 2025-06-04 NOTE — PATIENT INSTRUCTIONS
MRI brain WO contrast  Change Sinemet 25/100 mg 2 tablets three times a day, take every 5 hours while awake, take at 6am, 11am, and 4 pm  Stop Sinemet 10/100 mg  Continue Requip 1 mg nightly  Referral to speech therapy re: hypophonia, dysphagia  Vitamin B12, folate  Vitamin B6   Vitamin D   Manganese  Iron Studies  You need to stay physically active as discussed.   Call with any new symptoms or concerns.   Follow up in 6 weeks.

## 2025-06-11 NOTE — PROGRESS NOTES
re: hypophonia, dysphagia  Vitamin B12, folate  Vitamin B6   Vitamin D   Manganese  Iron Studies  You need to stay physically active as discussed.   Call with any new symptoms or concerns.   Follow up in 6 weeks.     I spent a total of 68 minutes on the date of the service which included preparing to see the patient, face-to-face patient care, completing clinical documentation, performing a medically appropriate examination, counseling and educating the patient/family, and ordering medications, tests, or procedures.       Beau Baer MD

## 2025-06-16 ENCOUNTER — LAB (OUTPATIENT)
Dept: LAB | Age: 65
End: 2025-06-16

## 2025-06-16 ENCOUNTER — TELEPHONE (OUTPATIENT)
Age: 65
End: 2025-06-16

## 2025-06-16 DIAGNOSIS — G20.B2 PARKINSON'S DISEASE WITH DYSKINESIA AND FLUCTUATING MANIFESTATIONS (HCC): ICD-10-CM

## 2025-06-16 DIAGNOSIS — N18.4 CKD (CHRONIC KIDNEY DISEASE), STAGE IV (HCC): ICD-10-CM

## 2025-06-16 DIAGNOSIS — E55.9 VITAMIN D DEFICIENCY: ICD-10-CM

## 2025-06-16 DIAGNOSIS — I25.10 ARTERIOSCLEROSIS OF CORONARY ARTERY: ICD-10-CM

## 2025-06-16 DIAGNOSIS — R25.8 BRADYKINESIA: ICD-10-CM

## 2025-06-16 DIAGNOSIS — R49.8 HYPOPHONIA: ICD-10-CM

## 2025-06-16 DIAGNOSIS — R80.9 MICROALBUMINURIA: ICD-10-CM

## 2025-06-16 LAB
25(OH)D3 SERPL-MCNC: 33 NG/ML (ref 30–100)
25(OH)D3 SERPL-MCNC: 36 NG/ML (ref 30–100)
ANION GAP SERPL CALC-SCNC: 12 MEQ/L (ref 8–16)
BUN SERPL-MCNC: 37 MG/DL (ref 8–23)
CALCIUM SERPL-MCNC: 9.4 MG/DL (ref 8.8–10.2)
CHLORIDE SERPL-SCNC: 103 MEQ/L (ref 98–111)
CHOLEST SERPL-MCNC: 86 MG/DL (ref 100–199)
CO2 SERPL-SCNC: 25 MEQ/L (ref 22–29)
CREAT SERPL-MCNC: 2.6 MG/DL (ref 0.7–1.2)
CREAT UR-MCNC: 87.8 MG/DL
FERRITIN SERPL IA-MCNC: 353 NG/ML (ref 30–400)
FOLATE SERPL-MCNC: > 20 NG/ML (ref 4.6–34.8)
GFR SERPL CREATININE-BSD FRML MDRD: 27 ML/MIN/1.73M2
GLUCOSE SERPL-MCNC: 81 MG/DL (ref 74–109)
HDLC SERPL-MCNC: 44 MG/DL
IRON SATN MFR SERPL: 23 % (ref 20–50)
IRON SERPL-MCNC: 77 UG/DL (ref 61–157)
LDLC SERPL CALC-MCNC: 29 MG/DL
MICROALBUMIN UR-MCNC: 29.9 MG/DL
MICROALBUMIN/CREAT RATIO PNL UR: 341 MG/G (ref 0–30)
POTASSIUM SERPL-SCNC: 4.4 MEQ/L (ref 3.5–5.2)
SODIUM SERPL-SCNC: 140 MEQ/L (ref 135–145)
TIBC SERPL-MCNC: 340 UG/DL (ref 171–450)
TRIGL SERPL-MCNC: 67 MG/DL (ref 0–199)
VIT B12 SERPL-MCNC: 859 PG/ML (ref 232–1245)

## 2025-06-16 NOTE — TELEPHONE ENCOUNTER
Specialty Medication Service    Date: 6/16/2025  Patient's Name: Jaspreet Keating YOB: 1960            _____________________________________________________________________________________________    Sent Mychart message to review lab results. Patient's lipid panel have resulted and were within normal limits. Lab results have been forwarded to the pharmacist for review as well. Patient has been instructed to call us at [phone] option 4 for review or with additional questions.     Aj Perry, [facility]  Ambulatory Clinical Pharmacist  Specialty Medication Services  Phone: [phone]  Fax: [phone]      For Pharmacy Admin Tracking Only    Program: [employer]  CPA in place:  Yes  Recommendation Provided To: Patient/Caregiver: 1 via Telephone  Intervention Detail: Lab(s) Ordered  Intervention Accepted By: Patient/Caregiver: 1  Time Spent (min): 15

## 2025-06-18 LAB — MISC. #1 REFERENCE GROUP TEST: NORMAL

## 2025-06-19 LAB — PYRIDOXAL PHOS SERPL-SCNC: 75.2 NMOL/L (ref 20–125)

## 2025-06-23 ENCOUNTER — OFFICE VISIT (OUTPATIENT)
Dept: NEPHROLOGY | Age: 65
End: 2025-06-23
Payer: COMMERCIAL

## 2025-06-23 VITALS
OXYGEN SATURATION: 96 % | SYSTOLIC BLOOD PRESSURE: 148 MMHG | HEIGHT: 69 IN | BODY MASS INDEX: 31.46 KG/M2 | HEART RATE: 65 BPM | WEIGHT: 212.4 LBS | DIASTOLIC BLOOD PRESSURE: 90 MMHG

## 2025-06-23 DIAGNOSIS — N18.4 CKD (CHRONIC KIDNEY DISEASE), STAGE IV (HCC): Primary | ICD-10-CM

## 2025-06-23 DIAGNOSIS — E11.21 DIABETIC NEPHROPATHY ASSOCIATED WITH TYPE 2 DIABETES MELLITUS (HCC): ICD-10-CM

## 2025-06-23 DIAGNOSIS — R80.9 MICROALBUMINURIA: ICD-10-CM

## 2025-06-23 DIAGNOSIS — I10 PRIMARY HYPERTENSION: ICD-10-CM

## 2025-06-23 PROCEDURE — 3077F SYST BP >= 140 MM HG: CPT | Performed by: INTERNAL MEDICINE

## 2025-06-23 PROCEDURE — 3080F DIAST BP >= 90 MM HG: CPT | Performed by: INTERNAL MEDICINE

## 2025-06-23 PROCEDURE — 99214 OFFICE O/P EST MOD 30 MIN: CPT | Performed by: INTERNAL MEDICINE

## 2025-06-23 RX ORDER — HYDROCODONE BITARTRATE AND ACETAMINOPHEN 5; 325 MG/1; MG/1
1 TABLET ORAL PRN
COMMUNITY

## 2025-06-23 RX ORDER — LOSARTAN POTASSIUM 25 MG/1
25 TABLET ORAL DAILY
Qty: 90 TABLET | Refills: 1 | Status: SHIPPED | OUTPATIENT
Start: 2025-06-23

## 2025-06-23 RX ORDER — MULTIVIT-MIN/IRON/FOLIC ACID/K 18-600-40
2000 CAPSULE ORAL DAILY
COMMUNITY

## 2025-06-23 NOTE — PROGRESS NOTES
Renal Progress Note    Assessment and Plan:      Diagnosis Orders   1. CKD (chronic kidney disease), stage IV (HCC)        2. Diabetic nephropathy associated with type 2 diabetes mellitus (HCC)        3. Primary hypertension        4. Microalbuminuria                  PLAN:  Serum creatinine slightly improved to 2.6 mg/dL from 2.9 mg/dL in March 2025.  This was discussed with the patient.  We reviewed the lab results together in epic.  I addressed his questions.  Diabetes mellitus is managed by another provider.  Hypertension is stable.  Microalbumin creatinine ratio is significantly improved to 341 mg/g from last level of 514.3 mg/g.  I gave him my congratulations.  However, we still have miles to go..  Continue low protein diet.  Medications reviewed.  He does not appear to be on ACE inhibitor or angiotensin receptor blocker.  I think there was a concern of worsening kidney function before.  In any case we will put him on a low-dose of losartan 25 mg once a day.  Return visit in 3 months with labs          Patient Active Problem List   Diagnosis    Primary parkinsonism (HCC)    REM sleep behavior disorder    Stage 4 chronic kidney disease (HCC)    Apnea    Arteriosclerosis of coronary artery    Diabetes mellitus (HCC)    Gout    Myocardial infarction (HCC)    Diabetic nephropathy associated with type 2 diabetes mellitus (HCC)           Subjective:   Chief complaint:  Chief Complaint   Patient presents with    Follow-up     3 month FU for CKD IV      HPI:This is a follow up visit for Mr. Jaspreet Keating here today for return appointment.  I see him for chronic kidney disease.  He was last seen in March 2025.  Doing well with no complaints since then.  Doing well.  No hospitalizations.  No recent chest pain or shortness of breath.  Appetite is good.  Blood pressure is high here in the office today. According to the patient blood pressure is good at home.  He monitors it at home    ROS:  Pertinent positives stated above 
Started vitamin D since last visit. No other changes.   
n/a

## 2025-07-02 ENCOUNTER — HOSPITAL ENCOUNTER (OUTPATIENT)
Dept: MRI IMAGING | Age: 65
Discharge: HOME | End: 2025-07-02
Payer: COMMERCIAL

## 2025-07-02 DIAGNOSIS — G20.B2 PARKINSON'S DISEASE WITH DYSKINESIA AND FLUCTUATING MANIFESTATIONS (HCC): ICD-10-CM

## 2025-07-02 DIAGNOSIS — R49.8 HYPOPHONIA: ICD-10-CM

## 2025-07-02 DIAGNOSIS — R25.8 BRADYKINESIA: ICD-10-CM

## 2025-07-02 PROCEDURE — 70551 MRI BRAIN STEM W/O DYE: CPT

## 2025-07-06 ENCOUNTER — RESULTS FOLLOW-UP (OUTPATIENT)
Dept: NEUROLOGY | Age: 65
End: 2025-07-06

## 2025-07-14 ENCOUNTER — OFFICE VISIT (OUTPATIENT)
Dept: NEUROLOGY | Age: 65
End: 2025-07-14
Payer: COMMERCIAL

## 2025-07-14 VITALS
BODY MASS INDEX: 30.63 KG/M2 | WEIGHT: 206.8 LBS | HEART RATE: 74 BPM | OXYGEN SATURATION: 97 % | SYSTOLIC BLOOD PRESSURE: 134 MMHG | HEIGHT: 69 IN | DIASTOLIC BLOOD PRESSURE: 72 MMHG

## 2025-07-14 DIAGNOSIS — G20.B2 PARKINSON'S DISEASE WITH DYSKINESIA AND FLUCTUATING MANIFESTATIONS (HCC): Primary | ICD-10-CM

## 2025-07-14 DIAGNOSIS — R25.8 BRADYKINESIA: ICD-10-CM

## 2025-07-14 DIAGNOSIS — R49.8 HYPOPHONIA: ICD-10-CM

## 2025-07-14 PROCEDURE — 99214 OFFICE O/P EST MOD 30 MIN: CPT | Performed by: NURSE PRACTITIONER

## 2025-07-14 NOTE — PROGRESS NOTES
NEUROLOGY OUT PATIENT FOLLOW UP NOTE:  7/14/20251:01 PM    Jaspreet Keating is here for follow up for parkinson's disease, bradykinesia, hypophonia.              Allergies   Allergen Reactions    Bee Venom Swelling       Current Outpatient Medications:     HYDROcodone-acetaminophen (NORCO) 5-325 MG per tablet, Take 1 tablet by mouth as needed for Pain. FOR GOUT, Disp: , Rfl:     vitamin D 50 MCG (2000 UT) CAPS capsule, Take 1 capsule by mouth daily, Disp: , Rfl:     losartan (COZAAR) 25 MG tablet, Take 1 tablet by mouth daily, Disp: 90 tablet, Rfl: 1    aspirin 81 MG EC tablet, Take 1 tablet by mouth daily, Disp: , Rfl:     doxepin (SINEQUAN) 10 MG capsule, Take 1 capsule by mouth nightly, Disp: , Rfl:     Evolocumab (REPATHA SURECLICK) SOAJ pen, Inject 1 mL into the skin every 14 days, Disp: 6 Adjustable Dose Pre-filled Pen Syringe, Rfl: 1    fesoterodine (TOVIAZ) 4 MG TB24 ER tablet, Take 1 tablet by mouth daily, Disp: , Rfl:     primidone (MYSOLINE) 50 MG tablet, Take 1 tablet by mouth daily, Disp: , Rfl:     OZEMPIC, 0.25 OR 0.5 MG/DOSE, 2 MG/3ML SOPN, Inject 0.5 mg into the skin every 7 days, Disp: , Rfl:     Insulin Pen Needle (PEN NEEDLES) 31G X 5 MM MISC, Inject 1 each into the skin 5 times daily, Disp: , Rfl:     rOPINIRole (REQUIP) 1 MG tablet, Take 1 tablet by mouth nightly 1-3 hours before bedtime, Disp: , Rfl:     carbidopa-levodopa (SINEMET)  MG per tablet, Take 2 tablets by mouth 3 times daily, Disp: , Rfl:     Continuous Blood Gluc  (FREESTYLE ODALYS READER) ELGIN, by Does not apply route, Disp: , Rfl:     Continuous Blood Gluc Sensor (FREESTYLE ODALYS 14 DAY SENSOR) MIS, by Does not apply route, Disp: , Rfl:     fenofibrate (TRICOR) 145 MG tablet, Take 1 tablet by mouth daily, Disp: , Rfl:     atorvastatin (LIPITOR) 40 MG tablet, Take 1 tablet by mouth daily, Disp: , Rfl:     hydrALAZINE (APRESOLINE) 25 MG tablet, Take 1 tablet by mouth 3 times daily, Disp: , Rfl:     labetalol (NORMODYNE)

## 2025-07-14 NOTE — PATIENT INSTRUCTIONS
Continue with Sinemet 25/100 mg 2 tablets three times a day, take every 5 hours while awake, take at 6am, 11am, and 4 pm    Continue Requip 1 mg nightly   Look into ROCK steady boxing classes at [facility] contact, Fiorella Cabrera, [phone]   Referral to speech therapy re: hypophonia, mouth floor exercises, PD  You need to stay physically active as discussed.   Call with any new symptoms or concerns.   Follow up in 3 months or sooner if needed with Dr. Baer

## 2025-07-18 ENCOUNTER — HOSPITAL ENCOUNTER (OUTPATIENT)
Dept: SPEECH THERAPY | Age: 65
Setting detail: THERAPIES SERIES
Discharge: HOME | End: 2025-07-18
Payer: COMMERCIAL

## 2025-07-18 PROCEDURE — 92524 BEHAVRAL QUALIT ANALYS VOICE: CPT

## 2025-07-18 NOTE — PROGRESS NOTES
[facility]  SPEECH THERAPY  [x] VOICE EVALUATION  [] DAILY NOTE   [] PROGRESS NOTE [] DISCHARGE NOTE    [x] [facility] [place]   [] [facility] CARE [place]    [] [facility]   [] [facility]    Date: 2025  Patient Name:  Jaspreet Keating  : 1960  MRN: [id number]  CSN: [id number]    Referring Practitioner Leopold, Paige L, APRN - CNP [id number]      Diagnosis  Diagnoses       R49.8 (ICD-10-CM) - Hypophonia           Treatment Diagnosis R49.0 Dysphonia   Date of Evaluation 25   Additional Pertinent History Jaspreet Keating has a past medical history of Diabetes mellitus (HCC), Gout, Hypertension, and Parkinson's disease (HCC).  he has a past surgical history that includes Coronary artery bypass graft and parathyroidectomy.     Allergies Allergies   Allergen Reactions    Bee Venom Swelling      Medications   Current Outpatient Medications:     HYDROcodone-acetaminophen (NORCO) 5-325 MG per tablet, Take 1 tablet by mouth as needed for Pain. FOR GOUT, Disp: , Rfl:     vitamin D 50 MCG (2000) CAPS capsule, Take 1 capsule by mouth daily, Disp: , Rfl:     losartan (COZAAR) 25 MG tablet, Take 1 tablet by mouth daily, Disp: 90 tablet, Rfl: 1    aspirin 81 MG EC tablet, Take 1 tablet by mouth daily, Disp: , Rfl:     doxepin (SINEQUAN) 10 MG capsule, Take 1 capsule by mouth nightly, Disp: , Rfl:     Evolocumab (REPATHA SURECLICK) SOAJ pen, Inject 1 mL into the skin every 14 days, Disp: 6 Adjustable Dose Pre-filled Pen Syringe, Rfl: 1    fesoterodine (TOVIAZ) 4 MG TB24 ER tablet, Take 1 tablet by mouth daily, Disp: , Rfl:     OZEMPIC, 0.25 OR 0.5 MG/DOSE, 2 MG/3ML SOPN, Inject 0.5 mg into the skin every 7 days, Disp: , Rfl:     HUMALOG KWIKPEN 100 UNIT/ML SOPN, Inject 14 Units into the skin 3 times daily (before meals) With sliding scale (Patient not taking: Reported on 2025), Disp: , Rfl:     Insulin Pen Needle (PEN NEEDLES) 31G X 5 MM MISC,

## 2025-07-23 ENCOUNTER — HOSPITAL ENCOUNTER (OUTPATIENT)
Dept: SPEECH THERAPY | Age: 65
Setting detail: THERAPIES SERIES
Discharge: HOME | End: 2025-07-23
Payer: COMMERCIAL

## 2025-07-23 PROCEDURE — 92507 TX SP LANG VOICE COMM INDIV: CPT | Performed by: SPEECH-LANGUAGE PATHOLOGIST

## 2025-07-23 NOTE — PROGRESS NOTES
[facility]  SPEECH THERAPY  [] VOICE EVALUATION  [x] DAILY NOTE   [] PROGRESS NOTE [] DISCHARGE NOTE    [x] [facility] [place]   [] [facility] [place]    [] [facility]   [] [facility]    Date: 2025  Patient Name:  Jaspreet Keating  : 1960  MRN: [id number]  CSN: [id number]    Referring Practitioner Leopold, Paige L, APRN - CNP [id number]      Diagnosis  Diagnoses       R49.8 (ICD-10-CM) - Hypophonia           Treatment Diagnosis R49.0 Dysphonia   Date of Evaluation 25   Additional Pertinent History Jaspreet Keating has a past medical history of Diabetes mellitus (HCC), Gout, Hypertension, and Parkinson's disease (HCC).  he has a past surgical history that includes Coronary artery bypass graft and parathyroidectomy.     Allergies Allergies   Allergen Reactions    Bee Venom Swelling      Medications   Current Outpatient Medications:     HYDROcodone-acetaminophen (NORCO) 5-325 MG per tablet, Take 1 tablet by mouth as needed for Pain. FOR GOUT, Disp: , Rfl:     vitamin D 50 MCG (2000) CAPS capsule, Take 1 capsule by mouth daily, Disp: , Rfl:     losartan (COZAAR) 25 MG tablet, Take 1 tablet by mouth daily, Disp: 90 tablet, Rfl: 1    aspirin 81 MG EC tablet, Take 1 tablet by mouth daily, Disp: , Rfl:     doxepin (SINEQUAN) 10 MG capsule, Take 1 capsule by mouth nightly, Disp: , Rfl:     Evolocumab (REPATHA SURECLICK) SOAJ pen, Inject 1 mL into the skin every 14 days, Disp: 6 Adjustable Dose Pre-filled Pen Syringe, Rfl: 1    fesoterodine (TOVIAZ) 4 MG TB24 ER tablet, Take 1 tablet by mouth daily, Disp: , Rfl:     OZEMPIC, 0.25 OR 0.5 MG/DOSE, 2 MG/3ML SOPN, Inject 0.5 mg into the skin every 7 days, Disp: , Rfl:     HUMALOG KWIKPEN 100 UNIT/ML SOPN, Inject 14 Units into the skin 3 times daily (before meals) With sliding scale (Patient not taking: Reported on 2025), Disp: , Rfl:     Insulin Pen Needle (PEN NEEDLES) 31G X 5 MM MISC,

## 2025-07-31 ENCOUNTER — HOSPITAL ENCOUNTER (OUTPATIENT)
Dept: SPEECH THERAPY | Age: 65
Setting detail: THERAPIES SERIES
Discharge: HOME | End: 2025-07-31
Payer: COMMERCIAL

## 2025-07-31 PROCEDURE — 92507 TX SP LANG VOICE COMM INDIV: CPT | Performed by: SPEECH-LANGUAGE PATHOLOGIST

## 2025-07-31 NOTE — PROGRESS NOTES
[facility]  SPEECH THERAPY  [] VOICE EVALUATION  [x] DAILY NOTE   [] PROGRESS NOTE [] DISCHARGE NOTE    [x] [facility] [place]   [] [facility] [place]    [] [facility]   [] [facility]    Date: 2025  Patient Name:  Jaspreet Keating  : 1960  MRN: [id number]  CSN: [id number]    Referring Practitioner Leopold, Paige L, APRN - CNP [id number]      Diagnosis  Diagnoses       R49.8 (ICD-10-CM) - Hypophonia           Treatment Diagnosis R49.0 Dysphonia   Date of Evaluation 25   Additional Pertinent History Jaspreet Keating has a past medical history of Diabetes mellitus (HCC), Gout, Hypertension, and Parkinson's disease (HCC).  he has a past surgical history that includes Coronary artery bypass graft and parathyroidectomy.     Allergies Allergies   Allergen Reactions    Bee Venom Swelling      Medications   Current Outpatient Medications:     HYDROcodone-acetaminophen (NORCO) 5-325 MG per tablet, Take 1 tablet by mouth as needed for Pain. FOR GOUT, Disp: , Rfl:     vitamin D 50 MCG (2000) CAPS capsule, Take 1 capsule by mouth daily, Disp: , Rfl:     losartan (COZAAR) 25 MG tablet, Take 1 tablet by mouth daily, Disp: 90 tablet, Rfl: 1    aspirin 81 MG EC tablet, Take 1 tablet by mouth daily, Disp: , Rfl:     doxepin (SINEQUAN) 10 MG capsule, Take 1 capsule by mouth nightly, Disp: , Rfl:     Evolocumab (REPATHA SURECLICK) SOAJ pen, Inject 1 mL into the skin every 14 days, Disp: 6 Adjustable Dose Pre-filled Pen Syringe, Rfl: 1    fesoterodine (TOVIAZ) 4 MG TB24 ER tablet, Take 1 tablet by mouth daily, Disp: , Rfl:     OZEMPIC, 0.25 OR 0.5 MG/DOSE, 2 MG/3ML SOPN, Inject 0.5 mg into the skin every 7 days, Disp: , Rfl:     HUMALOG KWIKPEN 100 UNIT/ML SOPN, Inject 14 Units into the skin 3 times daily (before meals) With sliding scale (Patient not taking: Reported on 2025), Disp: , Rfl:     Insulin Pen Needle (PEN NEEDLES) 31G X 5 MM MISC,

## 2025-08-04 ENCOUNTER — HOSPITAL ENCOUNTER (OUTPATIENT)
Dept: SPEECH THERAPY | Age: 65
Setting detail: THERAPIES SERIES
Discharge: HOME | End: 2025-08-04
Payer: COMMERCIAL

## 2025-08-04 PROCEDURE — 92507 TX SP LANG VOICE COMM INDIV: CPT

## 2025-08-06 ENCOUNTER — HOSPITAL ENCOUNTER (OUTPATIENT)
Dept: SPEECH THERAPY | Age: 65
Setting detail: THERAPIES SERIES
Discharge: HOME | End: 2025-08-06
Payer: COMMERCIAL

## 2025-08-06 PROCEDURE — 92507 TX SP LANG VOICE COMM INDIV: CPT

## 2025-08-08 ENCOUNTER — APPOINTMENT (OUTPATIENT)
Dept: PHYSICAL THERAPY | Age: 65
End: 2025-08-08
Payer: COMMERCIAL

## 2025-08-13 ENCOUNTER — HOSPITAL ENCOUNTER (OUTPATIENT)
Dept: SPEECH THERAPY | Age: 65
Setting detail: THERAPIES SERIES
End: 2025-08-13
Payer: COMMERCIAL

## 2025-08-15 ENCOUNTER — APPOINTMENT (OUTPATIENT)
Dept: SPEECH THERAPY | Age: 65
End: 2025-08-15
Payer: COMMERCIAL

## 2025-08-20 ENCOUNTER — HOSPITAL ENCOUNTER (OUTPATIENT)
Dept: SPEECH THERAPY | Age: 65
Setting detail: THERAPIES SERIES
End: 2025-08-20
Payer: COMMERCIAL

## 2025-08-22 ENCOUNTER — HOSPITAL ENCOUNTER (OUTPATIENT)
Dept: SPEECH THERAPY | Age: 65
Setting detail: THERAPIES SERIES
End: 2025-08-22
Payer: COMMERCIAL

## 2025-08-25 ENCOUNTER — APPOINTMENT (OUTPATIENT)
Dept: SPEECH THERAPY | Age: 65
End: 2025-08-25
Payer: COMMERCIAL

## 2025-08-27 ENCOUNTER — HOSPITAL ENCOUNTER (OUTPATIENT)
Dept: SPEECH THERAPY | Age: 65
Setting detail: THERAPIES SERIES
Discharge: HOME | End: 2025-08-27
Payer: COMMERCIAL

## 2025-08-27 PROCEDURE — 92507 TX SP LANG VOICE COMM INDIV: CPT

## 2025-09-03 ENCOUNTER — HOSPITAL ENCOUNTER (OUTPATIENT)
Dept: SPEECH THERAPY | Age: 65
Setting detail: THERAPIES SERIES
Discharge: HOME | End: 2025-09-03
Payer: COMMERCIAL

## 2025-09-03 PROCEDURE — 92507 TX SP LANG VOICE COMM INDIV: CPT

## 2025-09-03 RX ORDER — LOSARTAN POTASSIUM 25 MG/1
25 TABLET ORAL DAILY
Qty: 90 TABLET | Refills: 3 | Status: SHIPPED | OUTPATIENT
Start: 2025-09-03

## 2025-09-04 ENCOUNTER — HOSPITAL ENCOUNTER (OUTPATIENT)
Dept: SPEECH THERAPY | Age: 65
Setting detail: THERAPIES SERIES
Discharge: HOME | End: 2025-09-04
Payer: COMMERCIAL

## 2025-09-04 PROCEDURE — 92507 TX SP LANG VOICE COMM INDIV: CPT | Performed by: SPEECH-LANGUAGE PATHOLOGIST
